# Patient Record
Sex: FEMALE | Race: WHITE | NOT HISPANIC OR LATINO | Employment: FULL TIME | URBAN - METROPOLITAN AREA
[De-identification: names, ages, dates, MRNs, and addresses within clinical notes are randomized per-mention and may not be internally consistent; named-entity substitution may affect disease eponyms.]

---

## 2017-04-06 ENCOUNTER — OFFICE VISIT - RIVER FALLS (OUTPATIENT)
Dept: FAMILY MEDICINE | Facility: CLINIC | Age: 27
End: 2017-04-06

## 2017-04-06 ASSESSMENT — MIFFLIN-ST. JEOR: SCORE: 1759.15

## 2021-05-27 ENCOUNTER — RECORDS - HEALTHEAST (OUTPATIENT)
Dept: ADMINISTRATIVE | Facility: CLINIC | Age: 31
End: 2021-05-27

## 2021-05-30 ENCOUNTER — RECORDS - HEALTHEAST (OUTPATIENT)
Dept: ADMINISTRATIVE | Facility: CLINIC | Age: 31
End: 2021-05-30

## 2021-10-30 LAB
ABO (EXTERNAL): NORMAL
HEMOGLOBIN (EXTERNAL): 14 G/DL (ref 12–16)
HEPATITIS B SURFACE ANTIGEN (EXTERNAL): NONREACTIVE
HIV1+2 AB SERPL QL IA: NONREACTIVE
PLATELET COUNT (EXTERNAL): 270 10E3/UL (ref 150–400)
RH (EXTERNAL): POSITIVE
RUBELLA ANTIBODY IGG (EXTERNAL): NORMAL
VDRL (SYPHILIS) (EXTERNAL): NONREACTIVE

## 2022-02-12 VITALS
DIASTOLIC BLOOD PRESSURE: 86 MMHG | TEMPERATURE: 98.6 F | BODY MASS INDEX: 38.32 KG/M2 | WEIGHT: 230 LBS | HEIGHT: 65 IN | SYSTOLIC BLOOD PRESSURE: 134 MMHG | HEART RATE: 84 BPM

## 2022-02-16 NOTE — PROGRESS NOTES
Patient:   NEGRO ROGERS            MRN: [id number]            FIN: [id number]               Age:   26 years     Sex:  Female     :  1990   Associated Diagnoses:   Acute sinusitis   Author:   Venecia Jacobs      Visit Information   Source of history:  Self.       Chief Complaint   2017 11:27 AM CDT    Patient is here with c/o sinus pressure, headache, congestion and drainage, couging, chills, sore throat, and some SOB with exertion for past 1 week.        History of Present Illness   Pt complains of sinus pain and pressure, green/yellow nasal discharge and fatigue x 7 days.  She has not checked her temperature and has not felt sweaty, like she normally does when she has a fever, but she did feel chills one night.  She has had a mild headache off/on, but not her typical migraines.  She has a productive cough.  Feels winded after climbing stairs which is not typical for her.  She has been sleeping more and increased her water intake.  Takes nighttime cold and flu combo med product with some relief, but feels overall that she is not getting better.  She gets 1-2 sinus infections per year.  Believes that these are related to seasonal allergies.      Review of Systems   ROS negative except as noted in HPI.      Health Status   Allergies:    Allergic Reactions (Selected)  Severity Not Documented  Ceclor (No reactions were documented)  Nonallergic Reactions (Selected)  Mild  Zithromax (No reactions were documented)  Severity Not Documented  Pertussis vaccines (No reactions were documented)   Problem list:    All Problems (Selected)  Chronic tension headache / SNOMED CT 976604370 / Confirmed  LSIL (Low Grade Squamous Intraepithelial Lesion) on PAP Smear / ICD-9-.03 / Confirmed  Migraine / SNOMED CT 36933539 / Confirmed  Obesity / ICD-9-.00 / Probable  Chronic insomnia / SNOMED CT 796105435 / Confirmed  Tobacco user / SNOMED CT 040741735 / Probable   Medications:  (Selected)    Prescriptions  Prescribed  Diflucan 150 mg oral tablet: 1 tab(s) ( 150 mg ), po, once, # 1 tab(s), 0 Refill(s), Type: Soft Stop, Pharmacy: [facility], 1 tab(s) po once  Flonase 50 mcg/inh nasal spray: 1 spray(s), Nasal, Daily, # 1 EA, 2 Refill(s), Type: Soft Stop, Pharmacy: [facility],  spray(s) nasal daily  Inderal 20 mg oral tablet: 1 tab(s) ( 20 mg ), PO, daily, # 90 tab(s), 3 Refill(s), Type: Maintenance, Pharmacy: [facility], 1 tab(s) po bid,x90 day(s)  Loestrin Fe 1.5/30 oral tablet: 1 tab(s), PO, Daily, Instructions: take 21 days of active tablets then skip placebos and start the next pack, # 84 tab(s), 4 Refill(s), Type: Maintenance, Pharmacy: [facility], 1 tab(s) po daily,Instr:take 21 days of active tablets...  Prilosec 20 mg oral delayed release capsule: 1 cap(s) ( 20 mg ), PO, daily, Instructions: PRN, # 90 cap(s), 3 Refill(s), Type: Maintenance, Pharmacy: [facility]  ProAir HFA 90 mcg/inh inhalation aerosol: 2 puff(s), inh, qid, PRN: as needed for wheezing, # 3 EA, 3 Refill(s), Type: Maintenance, Pharmacy: [facility], 2 puff(s) inh qid,PRN:as needed for wheezing  amoxicillin 875 mg oral tablet: 1 tab(s) ( 875 mg ), PO, BID, # 20 tab(s), 0 Refill(s), Type: Maintenance, Pharmacy: [facility], 1 tab(s) po bid,x10 day(s)  ketorolac 10 mg oral tablet: See Instructions, Instructions: 1 tab(s) PO if migraine not leaving.   No more than 2 a week  20 tabs, PRN: for pain, # 20 tab(s), 0 Refill(s), Type: Maintenance, Pharmacy: [facility], 1 tab(s) PO if migraine not leaving.   No more...  Documented Medications  Documented  Topamax 25 mg oral tablet: 1 tab(s) ( 25 mg ), po, tid, 0 Refill(s), Type: Maintenance  loratadine: ( 10 mg ), prn, 0 Refill(s), Type: Maintenance  naproxen 250 mg oral tablet: 1 tab(s) ( 250 mg ), PO, tid, # 60 tab(s), 0 Refill(s), Type:  Maintenance      Histories   Past Medical History:    Active  Migraine (11195977)   Family History:    MI  Mother (Ana Paula)  Appendicitis  Mother (Ana Paula)  Polyp of colon  Father (Ean)  Migraine  Mother (Ana Paula)  Sister (Mesha)  Hypercholesterolemia  Father (Ean)  Cholecystectomy  Grandmother (P): onset at 16 .  Grandmother (M)  Skin cancer  Father (Ean)     Social History:        Alcohol Assessment: Current            Current, less than 1 time/week, 1 drinks/episode average.  1 drinks/episode maximum.      Tobacco Assessment: Current            Current (some day smoker)                     Comments:                      08/24/2016 - Natalie Rosario                     trying to quit      Substance Abuse Assessment: Current            Current, Marijuana      Employment and Education Assessment            Employed, Work/School description: Care Coordinator.      Home and Environment Assessment            Marital status: .  Spouse/Partner name: Jacoby Garcia.      Exercise and Physical Activity Assessment: Regular exercise            Exercise frequency: 3-5 times/week.  Exercise type: Walking.      Sexual Assessment            Sexually active: Yes.  Sexual orientation: Homosexual.  Contraceptive Use Details: Birth control pill.      Other Assessment            Last eye exam 7/12; Last dental exam 12/12        Physical Examination   Vital Signs   4/6/2017 11:27 AM CDT Temperature Tympanic 98.6 DegF    Peripheral Pulse Rate 84 bpm    HR Method Electronic    Systolic Blood Pressure 134 mmHg    Diastolic Blood Pressure 86 mmHg    Mean Arterial Pressure 102 mmHg    BP Site Right arm    BP Method Electronic      Measurements from flowsheet : Measurements   4/6/2017 11:27 AM CDT Height Measured - Standard 65 in    Weight Measured - Standard 230 lb    BSA 2.19 m2    Body Mass Index 38.27 kg/m2    Ht/Wt Measurement Refused by Patient? No      General:  Alert and oriented, No acute distress.    Eye:  Normal conjunctiva.     HENT:  Normocephalic, Oral mucosa is moist, No pharyngeal erythema.         Sinus: Bilateral, Ethmoid sinus, Maxillary sinus, Tenderness.    Neck:  Supple, Non-tender, No lymphadenopathy.    Respiratory:  Breath sounds are equal, Symmetrical chest wall expansion.         Respirations: Are within normal limits.         Pattern: Regular.    Cardiovascular:  Normal rate, Regular rhythm, No edema.    Musculoskeletal:  Normal gait.    Integumentary:  Warm, Dry, No rash.    Neurologic:  Alert, Oriented.    Cognition and Speech:  Oriented, Speech clear and coherent.    Psychiatric:  Cooperative, Appropriate mood & affect, Normal judgment.       Impression and Plan   Diagnosis     Acute sinusitis ([id number] J01.90).     Patient Instructions:       Counseled: Patient, Regarding diagnosis, Regarding treatment, Regarding medications.    Pt offered conservative management as I can't be certain that this is bacterial in origin.  Pt wants to take abx.  Rx for amoxicillin, flonase and diflucan prn if she develops vaginal yeast after taking antibiotics.

## 2022-05-09 ENCOUNTER — EXTERNAL ORDER RESULTS (OUTPATIENT)
Dept: LAB | Facility: CLINIC | Age: 32
End: 2022-05-09
Payer: COMMERCIAL

## 2022-05-12 LAB — GROUP B STREPTOCOCCUS (EXTERNAL): NEGATIVE

## 2022-05-18 ENCOUNTER — HOSPITAL ENCOUNTER (OUTPATIENT)
Facility: HOSPITAL | Age: 32
Discharge: HOME | End: 2022-05-18
Attending: OBSTETRICS & GYNECOLOGY | Admitting: OBSTETRICS & GYNECOLOGY
Payer: COMMERCIAL

## 2022-05-18 ENCOUNTER — HOSPITAL ENCOUNTER (INPATIENT)
Facility: CLINIC | Age: 32
LOS: 4 days | Discharge: HOME HEALTH | End: 2022-05-22
Attending: OBSTETRICS & GYNECOLOGY | Admitting: OBSTETRICS & GYNECOLOGY
Payer: COMMERCIAL

## 2022-05-18 ENCOUNTER — HOSPITAL ENCOUNTER (OUTPATIENT)
Facility: HOSPITAL | Age: 32
End: 2022-05-18
Admitting: OBSTETRICS & GYNECOLOGY
Payer: COMMERCIAL

## 2022-05-18 VITALS
HEART RATE: 101 BPM | RESPIRATION RATE: 18 BRPM | TEMPERATURE: 98.6 F | SYSTOLIC BLOOD PRESSURE: 136 MMHG | HEIGHT: 64 IN | WEIGHT: 237.44 LBS | BODY MASS INDEX: 40.54 KG/M2 | DIASTOLIC BLOOD PRESSURE: 82 MMHG | OXYGEN SATURATION: 97 %

## 2022-05-18 DIAGNOSIS — O13.3 PREGNANCY-INDUCED HYPERTENSION IN THIRD TRIMESTER: Primary | ICD-10-CM

## 2022-05-18 PROBLEM — O13.9 PIH (PREGNANCY INDUCED HYPERTENSION): Status: ACTIVE | Noted: 2022-05-18

## 2022-05-18 PROBLEM — G43.909 MIGRAINE: Status: ACTIVE | Noted: 2022-05-18

## 2022-05-18 LAB
ABO/RH(D): NORMAL
ABO/RH(D): NORMAL
ALBUMIN MFR UR ELPH: 11.3 MG/DL
ALT SERPL W P-5'-P-CCNC: 17 U/L (ref 0–45)
ANION GAP SERPL CALCULATED.3IONS-SCNC: 9 MMOL/L (ref 5–18)
ANTIBODY SCREEN: NEGATIVE
ANTIBODY SCREEN: NEGATIVE
AST SERPL W P-5'-P-CCNC: 17 U/L (ref 0–40)
BUN SERPL-MCNC: 6 MG/DL (ref 8–22)
CALCIUM SERPL-MCNC: 9.3 MG/DL (ref 8.5–10.5)
CHLORIDE BLD-SCNC: 107 MMOL/L (ref 98–107)
CO2 SERPL-SCNC: 20 MMOL/L (ref 22–31)
CREAT SERPL-MCNC: 0.6 MG/DL (ref 0.6–1.1)
CREAT UR-MCNC: 77 MG/DL
ERYTHROCYTE [DISTWIDTH] IN BLOOD BY AUTOMATED COUNT: 13.8 % (ref 10–15)
GFR SERPL CREATININE-BSD FRML MDRD: >90 ML/MIN/1.73M2
GLUCOSE BLD-MCNC: 105 MG/DL (ref 70–125)
HCT VFR BLD AUTO: 37.2 % (ref 35–47)
HGB BLD-MCNC: 12.6 G/DL (ref 11.7–15.7)
HOLD SPECIMEN: NORMAL
MCH RBC QN AUTO: 30.9 PG (ref 26.5–33)
MCHC RBC AUTO-ENTMCNC: 33.9 G/DL (ref 31.5–36.5)
MCV RBC AUTO: 91 FL (ref 78–100)
PLATELET # BLD AUTO: 184 10E3/UL (ref 150–450)
POTASSIUM BLD-SCNC: 3.8 MMOL/L (ref 3.5–5)
PROT/CREAT 24H UR: 0.15 MG/MG CR
RBC # BLD AUTO: 4.08 10E6/UL (ref 3.8–5.2)
SARS-COV-2 RNA RESP QL NAA+PROBE: NEGATIVE
SODIUM SERPL-SCNC: 136 MMOL/L (ref 136–145)
SPECIMEN EXPIRATION DATE: NORMAL
SPECIMEN EXPIRATION DATE: NORMAL
WBC # BLD AUTO: 11.6 10E3/UL (ref 4–11)

## 2022-05-18 PROCEDURE — 84156 ASSAY OF PROTEIN URINE: CPT | Performed by: OBSTETRICS & GYNECOLOGY

## 2022-05-18 PROCEDURE — 80048 BASIC METABOLIC PNL TOTAL CA: CPT | Performed by: OBSTETRICS & GYNECOLOGY

## 2022-05-18 PROCEDURE — 250N000013 HC RX MED GY IP 250 OP 250 PS 637: Performed by: OBSTETRICS & GYNECOLOGY

## 2022-05-18 PROCEDURE — C9803 HOPD COVID-19 SPEC COLLECT: HCPCS

## 2022-05-18 PROCEDURE — 120N000001 HC R&B MED SURG/OB

## 2022-05-18 PROCEDURE — 86901 BLOOD TYPING SEROLOGIC RH(D): CPT | Performed by: OBSTETRICS & GYNECOLOGY

## 2022-05-18 PROCEDURE — 250N000011 HC RX IP 250 OP 636: Performed by: OBSTETRICS & GYNECOLOGY

## 2022-05-18 PROCEDURE — 36415 COLL VENOUS BLD VENIPUNCTURE: CPT | Performed by: OBSTETRICS & GYNECOLOGY

## 2022-05-18 PROCEDURE — 84460 ALANINE AMINO (ALT) (SGPT): CPT | Performed by: OBSTETRICS & GYNECOLOGY

## 2022-05-18 PROCEDURE — 86850 RBC ANTIBODY SCREEN: CPT | Performed by: OBSTETRICS & GYNECOLOGY

## 2022-05-18 PROCEDURE — G0463 HOSPITAL OUTPT CLINIC VISIT: HCPCS

## 2022-05-18 PROCEDURE — 85027 COMPLETE CBC AUTOMATED: CPT | Performed by: OBSTETRICS & GYNECOLOGY

## 2022-05-18 PROCEDURE — U0005 INFEC AGEN DETEC AMPLI PROBE: HCPCS | Performed by: OBSTETRICS & GYNECOLOGY

## 2022-05-18 PROCEDURE — 84450 TRANSFERASE (AST) (SGOT): CPT | Performed by: OBSTETRICS & GYNECOLOGY

## 2022-05-18 RX ORDER — MAGNESIUM SULFATE HEPTAHYDRATE 40 MG/ML
2 INJECTION, SOLUTION INTRAVENOUS
Status: DISCONTINUED | OUTPATIENT
Start: 2022-05-18 | End: 2022-05-22 | Stop reason: HOSPADM

## 2022-05-18 RX ORDER — OXYTOCIN/0.9 % SODIUM CHLORIDE 30/500 ML
100-340 PLASTIC BAG, INJECTION (ML) INTRAVENOUS CONTINUOUS PRN
Status: DISCONTINUED | OUTPATIENT
Start: 2022-05-18 | End: 2022-05-22 | Stop reason: HOSPADM

## 2022-05-18 RX ORDER — KETOROLAC TROMETHAMINE 30 MG/ML
30 INJECTION, SOLUTION INTRAMUSCULAR; INTRAVENOUS
Status: DISCONTINUED | OUTPATIENT
Start: 2022-05-18 | End: 2022-05-22 | Stop reason: HOSPADM

## 2022-05-18 RX ORDER — ACETAMINOPHEN 325 MG/1
650 TABLET ORAL EVERY 4 HOURS PRN
Status: DISCONTINUED | OUTPATIENT
Start: 2022-05-18 | End: 2022-05-19

## 2022-05-18 RX ORDER — IBUPROFEN 800 MG/1
800 TABLET, FILM COATED ORAL
Status: DISCONTINUED | OUTPATIENT
Start: 2022-05-18 | End: 2022-05-22 | Stop reason: HOSPADM

## 2022-05-18 RX ORDER — MULTIVIT-MIN/IRON/FOLIC ACID/K 18-600-40
2 CAPSULE ORAL DAILY
COMMUNITY

## 2022-05-18 RX ORDER — OXYTOCIN/0.9 % SODIUM CHLORIDE 30/500 ML
340 PLASTIC BAG, INJECTION (ML) INTRAVENOUS CONTINUOUS PRN
Status: DISCONTINUED | OUTPATIENT
Start: 2022-05-18 | End: 2022-05-21 | Stop reason: HOSPADM

## 2022-05-18 RX ORDER — ACETAMINOPHEN 325 MG/1
650 TABLET ORAL EVERY 4 HOURS PRN
Status: DISCONTINUED | OUTPATIENT
Start: 2022-05-18 | End: 2022-05-18 | Stop reason: HOSPADM

## 2022-05-18 RX ORDER — METOCLOPRAMIDE HYDROCHLORIDE 5 MG/ML
10 INJECTION INTRAMUSCULAR; INTRAVENOUS EVERY 6 HOURS PRN
Status: DISCONTINUED | OUTPATIENT
Start: 2022-05-18 | End: 2022-05-21 | Stop reason: HOSPADM

## 2022-05-18 RX ORDER — DOCUSATE SODIUM 100 MG/1
100 CAPSULE, LIQUID FILLED ORAL 2 TIMES DAILY
Status: DISCONTINUED | OUTPATIENT
Start: 2022-05-18 | End: 2022-05-21 | Stop reason: HOSPADM

## 2022-05-18 RX ORDER — OXYCODONE HYDROCHLORIDE 5 MG/1
5 TABLET ORAL
Status: DISCONTINUED | OUTPATIENT
Start: 2022-05-18 | End: 2022-05-22 | Stop reason: HOSPADM

## 2022-05-18 RX ORDER — HYDROXYZINE HYDROCHLORIDE 25 MG/1
50 TABLET, FILM COATED ORAL
Status: DISCONTINUED | OUTPATIENT
Start: 2022-05-18 | End: 2022-05-21 | Stop reason: HOSPADM

## 2022-05-18 RX ORDER — ONDANSETRON 2 MG/ML
4 INJECTION INTRAMUSCULAR; INTRAVENOUS EVERY 6 HOURS PRN
Status: DISCONTINUED | OUTPATIENT
Start: 2022-05-18 | End: 2022-05-21 | Stop reason: HOSPADM

## 2022-05-18 RX ORDER — MISOPROSTOL 100 UG/1
25 TABLET ORAL
Status: DISCONTINUED | OUTPATIENT
Start: 2022-05-18 | End: 2022-05-21 | Stop reason: HOSPADM

## 2022-05-18 RX ORDER — MAGNESIUM SULFATE HEPTAHYDRATE 500 MG/ML
10 INJECTION, SOLUTION INTRAMUSCULAR; INTRAVENOUS
Status: DISCONTINUED | OUTPATIENT
Start: 2022-05-18 | End: 2022-05-22 | Stop reason: HOSPADM

## 2022-05-18 RX ORDER — METHYLERGONOVINE MALEATE 0.2 MG/ML
200 INJECTION INTRAVENOUS
Status: DISCONTINUED | OUTPATIENT
Start: 2022-05-18 | End: 2022-05-21 | Stop reason: HOSPADM

## 2022-05-18 RX ORDER — CITRIC ACID/SODIUM CITRATE 334-500MG
30 SOLUTION, ORAL ORAL
Status: DISCONTINUED | OUTPATIENT
Start: 2022-05-18 | End: 2022-05-21 | Stop reason: HOSPADM

## 2022-05-18 RX ORDER — NIFEDIPINE 10 MG/1
10-20 CAPSULE ORAL
Status: DISCONTINUED | OUTPATIENT
Start: 2022-05-18 | End: 2022-05-22 | Stop reason: HOSPADM

## 2022-05-18 RX ORDER — LORAZEPAM 2 MG/ML
2 INJECTION INTRAMUSCULAR
Status: DISCONTINUED | OUTPATIENT
Start: 2022-05-18 | End: 2022-05-22 | Stop reason: HOSPADM

## 2022-05-18 RX ORDER — FAMOTIDINE 10 MG
10 TABLET ORAL DAILY
COMMUNITY

## 2022-05-18 RX ORDER — NALOXONE HYDROCHLORIDE 0.4 MG/ML
0.4 INJECTION, SOLUTION INTRAMUSCULAR; INTRAVENOUS; SUBCUTANEOUS
Status: DISCONTINUED | OUTPATIENT
Start: 2022-05-18 | End: 2022-05-21 | Stop reason: HOSPADM

## 2022-05-18 RX ORDER — MULTIVITAMIN WITH IRON
1 TABLET ORAL DAILY
COMMUNITY

## 2022-05-18 RX ORDER — MAGNESIUM SULFATE 4 G/50ML
4 INJECTION INTRAVENOUS
Status: DISCONTINUED | OUTPATIENT
Start: 2022-05-18 | End: 2022-05-22 | Stop reason: HOSPADM

## 2022-05-18 RX ORDER — PROCHLORPERAZINE MALEATE 10 MG
10 TABLET ORAL EVERY 6 HOURS PRN
Status: DISCONTINUED | OUTPATIENT
Start: 2022-05-18 | End: 2022-05-21 | Stop reason: HOSPADM

## 2022-05-18 RX ORDER — NALOXONE HYDROCHLORIDE 0.4 MG/ML
0.2 INJECTION, SOLUTION INTRAMUSCULAR; INTRAVENOUS; SUBCUTANEOUS
Status: DISCONTINUED | OUTPATIENT
Start: 2022-05-18 | End: 2022-05-21 | Stop reason: HOSPADM

## 2022-05-18 RX ORDER — MISOPROSTOL 200 UG/1
400 TABLET ORAL
Status: DISCONTINUED | OUTPATIENT
Start: 2022-05-18 | End: 2022-05-21 | Stop reason: HOSPADM

## 2022-05-18 RX ORDER — ACETAMINOPHEN 325 MG/1
325-650 TABLET ORAL EVERY 6 HOURS PRN
COMMUNITY

## 2022-05-18 RX ORDER — CARBOPROST TROMETHAMINE 250 UG/ML
250 INJECTION, SOLUTION INTRAMUSCULAR
Status: DISCONTINUED | OUTPATIENT
Start: 2022-05-18 | End: 2022-05-21 | Stop reason: HOSPADM

## 2022-05-18 RX ORDER — LABETALOL HYDROCHLORIDE 5 MG/ML
20 INJECTION, SOLUTION INTRAVENOUS
Status: DISCONTINUED | OUTPATIENT
Start: 2022-05-18 | End: 2022-05-22 | Stop reason: HOSPADM

## 2022-05-18 RX ORDER — LIDOCAINE 40 MG/G
CREAM TOPICAL
Status: DISCONTINUED | OUTPATIENT
Start: 2022-05-18 | End: 2022-05-18 | Stop reason: HOSPADM

## 2022-05-18 RX ORDER — ONDANSETRON 4 MG/1
4 TABLET, ORALLY DISINTEGRATING ORAL EVERY 6 HOURS PRN
Status: DISCONTINUED | OUTPATIENT
Start: 2022-05-18 | End: 2022-05-21 | Stop reason: HOSPADM

## 2022-05-18 RX ORDER — METOCLOPRAMIDE 10 MG/1
10 TABLET ORAL EVERY 6 HOURS PRN
Status: DISCONTINUED | OUTPATIENT
Start: 2022-05-18 | End: 2022-05-21 | Stop reason: HOSPADM

## 2022-05-18 RX ORDER — PROCHLORPERAZINE 25 MG
25 SUPPOSITORY, RECTAL RECTAL EVERY 12 HOURS PRN
Status: DISCONTINUED | OUTPATIENT
Start: 2022-05-18 | End: 2022-05-21 | Stop reason: HOSPADM

## 2022-05-18 RX ORDER — DOCUSATE SODIUM 100 MG/1
100 CAPSULE, LIQUID FILLED ORAL 2 TIMES DAILY
COMMUNITY

## 2022-05-18 RX ORDER — OXYTOCIN 10 [USP'U]/ML
10 INJECTION, SOLUTION INTRAMUSCULAR; INTRAVENOUS
Status: DISCONTINUED | OUTPATIENT
Start: 2022-05-18 | End: 2022-05-22 | Stop reason: HOSPADM

## 2022-05-18 RX ORDER — PRENATAL VIT/IRON FUM/FOLIC AC 27MG-0.8MG
1 TABLET ORAL DAILY
COMMUNITY

## 2022-05-18 RX ORDER — MORPHINE SULFATE 10 MG/ML
15 INJECTION, SOLUTION INTRAMUSCULAR; INTRAVENOUS
Status: COMPLETED | OUTPATIENT
Start: 2022-05-18 | End: 2022-05-18

## 2022-05-18 RX ORDER — OXYTOCIN 10 [USP'U]/ML
10 INJECTION, SOLUTION INTRAMUSCULAR; INTRAVENOUS
Status: DISCONTINUED | OUTPATIENT
Start: 2022-05-18 | End: 2022-05-21 | Stop reason: HOSPADM

## 2022-05-18 RX ORDER — MISOPROSTOL 200 UG/1
800 TABLET ORAL
Status: DISCONTINUED | OUTPATIENT
Start: 2022-05-18 | End: 2022-05-21 | Stop reason: HOSPADM

## 2022-05-18 RX ADMIN — DOCUSATE SODIUM 100 MG: 100 CAPSULE, LIQUID FILLED ORAL at 22:42

## 2022-05-18 RX ADMIN — MISOPROSTOL 25 MCG: 100 TABLET ORAL at 22:42

## 2022-05-18 RX ADMIN — ACETAMINOPHEN 650 MG: 325 TABLET ORAL at 16:01

## 2022-05-18 RX ADMIN — HYDROXYZINE HYDROCHLORIDE 50 MG: 25 TABLET, FILM COATED ORAL at 22:41

## 2022-05-18 RX ADMIN — MORPHINE SULFATE 15 MG: 10 INJECTION INTRAVENOUS at 22:45

## 2022-05-18 RX ADMIN — ACETAMINOPHEN 650 MG: 325 TABLET ORAL at 22:40

## 2022-05-18 ASSESSMENT — ACTIVITIES OF DAILY LIVING (ADL)
CHANGE_IN_FUNCTIONAL_STATUS_SINCE_ONSET_OF_CURRENT_ILLNESS/INJURY: NO
DIFFICULTY_COMMUNICATING: NO
DOING_ERRANDS_INDEPENDENTLY_DIFFICULTY: NO
WALKING_OR_CLIMBING_STAIRS_DIFFICULTY: NO
HEARING_DIFFICULTY_OR_DEAF: NO
FALL_HISTORY_WITHIN_LAST_SIX_MONTHS: NO
TOILETING_ISSUES: NO
DRESSING/BATHING_DIFFICULTY: NO
WEAR_GLASSES_OR_BLIND: NO
DIFFICULTY_EATING/SWALLOWING: NO
CONCENTRATING,_REMEMBERING_OR_MAKING_DECISIONS_DIFFICULTY: NO
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
## 2022-05-18 NOTE — DISCHARGE INSTRUCTIONS
Please go to [facility] this evening for induction of labor per patient care plan as discussed with Dr Huber

## 2022-05-19 LAB
HOLD SPECIMEN: NORMAL
HOLD SPECIMEN: NORMAL
PLATELET # BLD AUTO: 153 10E3/UL (ref 150–450)

## 2022-05-19 PROCEDURE — 250N000013 HC RX MED GY IP 250 OP 250 PS 637: Performed by: OBSTETRICS & GYNECOLOGY

## 2022-05-19 PROCEDURE — 36415 COLL VENOUS BLD VENIPUNCTURE: CPT

## 2022-05-19 PROCEDURE — 258N000003 HC RX IP 258 OP 636: Performed by: OBSTETRICS & GYNECOLOGY

## 2022-05-19 PROCEDURE — 120N000001 HC R&B MED SURG/OB

## 2022-05-19 PROCEDURE — 85049 AUTOMATED PLATELET COUNT: CPT

## 2022-05-19 PROCEDURE — 250N000009 HC RX 250: Performed by: OBSTETRICS & GYNECOLOGY

## 2022-05-19 RX ORDER — FENTANYL CITRATE 50 UG/ML
100 INJECTION, SOLUTION INTRAMUSCULAR; INTRAVENOUS
Status: DISCONTINUED | OUTPATIENT
Start: 2022-05-19 | End: 2022-05-21 | Stop reason: HOSPADM

## 2022-05-19 RX ORDER — ACETAMINOPHEN 325 MG/1
650 TABLET ORAL EVERY 4 HOURS PRN
Status: DISCONTINUED | OUTPATIENT
Start: 2022-05-19 | End: 2022-05-21 | Stop reason: HOSPADM

## 2022-05-19 RX ORDER — LIDOCAINE 40 MG/G
CREAM TOPICAL
Status: DISCONTINUED | OUTPATIENT
Start: 2022-05-19 | End: 2022-05-21 | Stop reason: HOSPADM

## 2022-05-19 RX ORDER — OXYTOCIN/0.9 % SODIUM CHLORIDE 30/500 ML
1-24 PLASTIC BAG, INJECTION (ML) INTRAVENOUS CONTINUOUS
Status: DISCONTINUED | OUTPATIENT
Start: 2022-05-19 | End: 2022-05-21 | Stop reason: HOSPADM

## 2022-05-19 RX ORDER — SODIUM CHLORIDE, SODIUM LACTATE, POTASSIUM CHLORIDE, CALCIUM CHLORIDE 600; 310; 30; 20 MG/100ML; MG/100ML; MG/100ML; MG/100ML
INJECTION, SOLUTION INTRAVENOUS CONTINUOUS PRN
Status: DISCONTINUED | OUTPATIENT
Start: 2022-05-19 | End: 2022-05-21 | Stop reason: HOSPADM

## 2022-05-19 RX ADMIN — MISOPROSTOL 25 MCG: 100 TABLET ORAL at 07:22

## 2022-05-19 RX ADMIN — MISOPROSTOL 25 MCG: 100 TABLET ORAL at 00:51

## 2022-05-19 RX ADMIN — DOCUSATE SODIUM 100 MG: 100 CAPSULE, LIQUID FILLED ORAL at 09:22

## 2022-05-19 RX ADMIN — Medication 2 MILLI-UNITS/MIN: at 22:20

## 2022-05-19 RX ADMIN — MISOPROSTOL 25 MCG: 100 TABLET ORAL at 03:07

## 2022-05-19 RX ADMIN — ACETAMINOPHEN 650 MG: 325 TABLET ORAL at 11:06

## 2022-05-19 RX ADMIN — MISOPROSTOL 25 MCG: 100 TABLET ORAL at 09:22

## 2022-05-19 RX ADMIN — MISOPROSTOL 25 MCG: 100 TABLET ORAL at 05:19

## 2022-05-19 RX ADMIN — SODIUM CHLORIDE, POTASSIUM CHLORIDE, SODIUM LACTATE AND CALCIUM CHLORIDE: 600; 310; 30; 20 INJECTION, SOLUTION INTRAVENOUS at 22:19

## 2022-05-19 NOTE — PROGRESS NOTES
"Pt presents to unit for a scheduled induction of labor for hypertension. Upon arrival to unit, pt reports that she has had a headache, but she also has chronic migraines and \"it's hard to tell if it's my regular migraine or something else.\" She rates her headache pain 3/10 on pain scale. She denies presence of visual changes, epigastric/RUQ pain, nausea. She does have +1 pitting edema to her BLE. DTR are WNL. No clonus. FM+ per pt and with leopolds. Pt has been having cramping \"all day\". Denies leaking any fluid or bleeding vaginally.   Dr Huber called and updated on patient's arrival. Plan of care cytotec for ripening, sleep meds. Pt verbalized agreement with plan. Dr Cedillo will follow patient through the night.     SVE perfomed due to presence of contractions - reveals 1/60/-3, sutures felt with exam. Faye score 7. Pt given 1st dose of cytotec and sleep meds. She is aware of s/sx to report to RN.  and call light at bedside. Pt denies any other questions or concerns at this time.   "

## 2022-05-19 NOTE — PROGRESS NOTES
Pt reports that her headache has been improved since receiving the medication to help her sleep. She denies any visual changes, epigastric/RUQ pain, nausea. Pitting edema remains unchanged to her feet, general edema to her legs.   FHR has been difficult to keep on EFM if pt is not on her right side or semi-fowlers. FHR found to be in a slightly higher position as compared to admission - resident MD, Dr Serna, called and updated and performed a bedside US to confirm vertex.     Pt denies feeling any contractions or cramping when she is lying down and has been able to sleep well tonight.

## 2022-05-19 NOTE — PROGRESS NOTES
Pt reports that she has been able to sleep after receiving meds. After patient took her 2nd dose of cytotec, she ambulated to the BR and then changed positions in bed. This RN had a difficult time keeping FHR on EFM while she is lying on her left side. RN remained at bedside and could auscultate the FHR, but it would not record on EFM. This RN attempted to apply the Loyda monitor x2 and was unsuccessful with the signal in the room despite much troubleshooting. Pt repositioned back on her right side and EFM applied. FM+ per pt and by leopolds for the duration of the difficulty monitoring the baby.   Pt is back resting. Will delay pt's 3rd dose of cytotec until longer duration of EFM obtained.

## 2022-05-19 NOTE — H&P
[facility] Labor and Delivery History and Physical    Renetta Mccauley MRN# [id number]   Age: 32 year old YOB: 1990     Date of Admission:  2022    Primary care provider: Leonela Sanches           Chief Complaint:   Renetta Mccauley is a 32 year old female who is 38w0d pregnant and being admitted for Gestational Hypertension. Has hisotry of migraines and the current HA is mild. No visual changes, abd pain.BPs with diastolics in 90s at [facility] today. No contracitons, vag bleeding or ROM. Good FM.          Pregnancy history:     OBSTETRIC HISTORY:    OB History    Para Term  AB Living   1 0 0 0 0 0   SAB IAB Ectopic Multiple Live Births   0 0 0 0 0      # Outcome Date GA Lbr Samson/2nd Weight Sex Delivery Anes PTL Lv   1 Current                EDC: Estimated Date of Delivery: 22    Prenatal Labs:   Lab Results   Component Value Date    AS Negative 2022    HGB 12.6 2022       GBS Status:   No results found for: GBS    Active Problem List  Patient Active Problem List   Diagnosis     Migraine       Medication Prior to Admission  Medications Prior to Admission   Medication Sig Dispense Refill Last Dose     acetaminophen (TYLENOL) 325 MG tablet Take 325-650 mg by mouth every 6 hours as needed for mild pain   2022 at 1600     docusate sodium (COLACE) 100 MG capsule Take 100 mg by mouth 2 times daily   2022 at Unknown time     doxylamine (UNISOM) 25 MG TABS tablet Take 25 mg by mouth At Bedtime   2022 at Unknown time     famotidine (PEPCID) 10 MG tablet Take 10 mg by mouth daily   2022 at Unknown time     magnesium 250 MG tablet Take 1 tablet by mouth daily   2022 at Unknown time     Prenatal Vit-Fe Fumarate-FA (PRENATAL MULTIVITAMIN W/IRON) 27-0.8 MG tablet Take 1 tablet by mouth daily   2022 at Unknown time     Vitamin D (Cholecalciferol) 25 MCG (1000 UT) TABS Take 2 tablets by mouth daily   2022 at Unknown time   .         Maternal Past Medical History:     Past Medical History:   Diagnosis Date     Hypertension      Migraine                        Family History:   This patient has no significant family history            Social History:   This patient has no significant social history         Review of Systems:   CONSTITUTIONAL: NEGATIVE for fever, chills, change in weight  ENT/MOUTH: NEGATIVE for ear, mouth and throat problems  RESP: NEGATIVE for significant cough or SOB  CV: NEGATIVE for chest pain, palpitations or peripheral edema          Physical Exam:   Vitals were reviewed  Temp: 97.8  F (36.6  C) Temp src: Oral BP: 133/74     Resp: 18        Lungs:   No increased work of breathing, good air exchange, clear to auscultation bilaterally, no crackles or wheezing     Cardiovascular:   regular rate and rhythm     Abdomen:   No scars, normal bowel sounds, soft, non-distended, non-tender, no masses palpated, no hepatosplenomegally      Cervix:   Membranes: intact   Cervix closed per Dr. porras today  Presentation:Vertex  Fetal Heart Rate Tracing: Cat 1  Tocometer: no contractions  HELLP labs normal and PCR 0.15                       Assessment:   Renetta Mccauley is a 38w0d pregnant female admitted with Gestations HTN at 38 weeks.          Plan:   cervical ripening with misoprostol p[er plan with Dr. Porras.   Will treat for Bps over 160/110      Ruddy Cedillo MD

## 2022-05-19 NOTE — PROGRESS NOTES
OB NOTE    Latent phase induction  BP good  Slept well  Plan Pitocin later today as needed    Rasta Huber MD

## 2022-05-19 NOTE — PROGRESS NOTES
OB NOTE    Ctx q 3-4 min  FHT:CAT1  Did not start Pit  BP normal range  Recheck @ 2 hours    Rasta Huber MD

## 2022-05-20 ENCOUNTER — ANESTHESIA (OUTPATIENT)
Dept: OBGYN | Facility: CLINIC | Age: 32
End: 2022-05-20
Payer: COMMERCIAL

## 2022-05-20 ENCOUNTER — ANESTHESIA EVENT (OUTPATIENT)
Dept: OBGYN | Facility: CLINIC | Age: 32
End: 2022-05-20
Payer: COMMERCIAL

## 2022-05-20 PROCEDURE — 250N000011 HC RX IP 250 OP 636: Performed by: ANESTHESIOLOGY

## 2022-05-20 PROCEDURE — 722N000001 HC LABOR CARE VAGINAL DELIVERY SINGLE

## 2022-05-20 PROCEDURE — 3E0R3BZ INTRODUCTION OF ANESTHETIC AGENT INTO SPINAL CANAL, PERCUTANEOUS APPROACH: ICD-10-PCS | Performed by: ANESTHESIOLOGY

## 2022-05-20 PROCEDURE — 00HU33Z INSERTION OF INFUSION DEVICE INTO SPINAL CANAL, PERCUTANEOUS APPROACH: ICD-10-PCS | Performed by: ANESTHESIOLOGY

## 2022-05-20 PROCEDURE — 250N000013 HC RX MED GY IP 250 OP 250 PS 637: Performed by: OBSTETRICS & GYNECOLOGY

## 2022-05-20 PROCEDURE — 3E033VJ INTRODUCTION OF OTHER HORMONE INTO PERIPHERAL VEIN, PERCUTANEOUS APPROACH: ICD-10-PCS | Performed by: OBSTETRICS & GYNECOLOGY

## 2022-05-20 PROCEDURE — 258N000003 HC RX IP 258 OP 636: Performed by: ANESTHESIOLOGY

## 2022-05-20 PROCEDURE — 120N000001 HC R&B MED SURG/OB

## 2022-05-20 PROCEDURE — 370N000003 HC ANESTHESIA WARD SERVICE

## 2022-05-20 PROCEDURE — 250N000009 HC RX 250: Performed by: OBSTETRICS & GYNECOLOGY

## 2022-05-20 PROCEDURE — 250N000013 HC RX MED GY IP 250 OP 250 PS 637

## 2022-05-20 PROCEDURE — 10907ZC DRAINAGE OF AMNIOTIC FLUID, THERAPEUTIC FROM PRODUCTS OF CONCEPTION, VIA NATURAL OR ARTIFICIAL OPENING: ICD-10-PCS | Performed by: OBSTETRICS & GYNECOLOGY

## 2022-05-20 PROCEDURE — 250N000011 HC RX IP 250 OP 636: Performed by: OBSTETRICS & GYNECOLOGY

## 2022-05-20 PROCEDURE — 250N000009 HC RX 250: Performed by: ANESTHESIOLOGY

## 2022-05-20 PROCEDURE — 258N000003 HC RX IP 258 OP 636: Performed by: OBSTETRICS & GYNECOLOGY

## 2022-05-20 PROCEDURE — 0KQM0ZZ REPAIR PERINEUM MUSCLE, OPEN APPROACH: ICD-10-PCS | Performed by: OBSTETRICS & GYNECOLOGY

## 2022-05-20 RX ORDER — BUPIVACAINE HYDROCHLORIDE 2.5 MG/ML
INJECTION, SOLUTION EPIDURAL; INFILTRATION; INTRACAUDAL
Status: COMPLETED | OUTPATIENT
Start: 2022-05-20 | End: 2022-05-20

## 2022-05-20 RX ORDER — NALBUPHINE HYDROCHLORIDE 10 MG/ML
2.5-5 INJECTION, SOLUTION INTRAMUSCULAR; INTRAVENOUS; SUBCUTANEOUS EVERY 6 HOURS PRN
Status: DISCONTINUED | OUTPATIENT
Start: 2022-05-20 | End: 2022-05-22 | Stop reason: HOSPADM

## 2022-05-20 RX ORDER — EPHEDRINE SULFATE 50 MG/ML
5 INJECTION, SOLUTION INTRAMUSCULAR; INTRAVENOUS; SUBCUTANEOUS
Status: DISCONTINUED | OUTPATIENT
Start: 2022-05-20 | End: 2022-05-21 | Stop reason: HOSPADM

## 2022-05-20 RX ADMIN — Medication 5 MG: at 01:33

## 2022-05-20 RX ADMIN — ACETAMINOPHEN 650 MG: 325 TABLET, COATED ORAL at 09:56

## 2022-05-20 RX ADMIN — KETOROLAC TROMETHAMINE 30 MG: 30 INJECTION, SOLUTION INTRAMUSCULAR at 15:55

## 2022-05-20 RX ADMIN — BUPIVACAINE HYDROCHLORIDE 8 ML: 2.5 INJECTION, SOLUTION EPIDURAL; INFILTRATION; INTRACAUDAL at 01:05

## 2022-05-20 RX ADMIN — Medication 5 MG: at 01:16

## 2022-05-20 RX ADMIN — Medication: at 01:04

## 2022-05-20 RX ADMIN — LIDOCAINE HYDROCHLORIDE 6 ML: 10 INJECTION, SOLUTION EPIDURAL; INFILTRATION; INTRACAUDAL; PERINEURAL at 15:01

## 2022-05-20 RX ADMIN — Medication: at 09:08

## 2022-05-20 RX ADMIN — Medication 340 ML/HR: at 14:53

## 2022-05-20 RX ADMIN — SODIUM CHLORIDE, POTASSIUM CHLORIDE, SODIUM LACTATE AND CALCIUM CHLORIDE 1000 ML: 600; 310; 30; 20 INJECTION, SOLUTION INTRAVENOUS at 05:30

## 2022-05-20 RX ADMIN — ACETAMINOPHEN 650 MG: 325 TABLET, COATED ORAL at 20:49

## 2022-05-20 RX ADMIN — SODIUM CHLORIDE, POTASSIUM CHLORIDE, SODIUM LACTATE AND CALCIUM CHLORIDE: 600; 310; 30; 20 INJECTION, SOLUTION INTRAVENOUS at 00:25

## 2022-05-20 RX ADMIN — DOCUSATE SODIUM 100 MG: 100 CAPSULE, LIQUID FILLED ORAL at 17:00

## 2022-05-20 NOTE — L&D DELIVERY NOTE
Delivery Summary    Renetta Mccauley MRN# [id number]   Age: 32 year old YOB: 1990     ASSESSMENT & PLAN: WOOD Mccauley Female-Renetta [[id number]]    Labor Event Times    Labor onset date: 22 Onset time:  5:00 PM   Dilation complete date: 22 Complete time: 10:50 AM   Start pushing date/time: 2022 1050      Labor Length    1st Stage (hrs): 17 (min): 50   2nd Stage (hrs): 3 (min): 56   3rd Stage (hrs): 0 (min): 7      Labor Events     labor?: No   steroids: None     Antibiotics received during labor?: No     Rupture date/time: 22 1704   Rupture type: Spontaneous rupture of membranes occuring during spontaneous labor or augmentation  Fluid color: Clear  Fluid odor: Normal     Induction: Oxytocin  Induction date/time:     Cervical ripening date/time:     Indications for induction: Mild Preeclampsia     Augmentation: AROM  Indications for augmentation: Hypertension     Delivery/Placenta Date and Time    Delivery Date: 22 Delivery Time:  2:46 PM   Placenta Date/Time: 2022  2:53 PM  Oxytocin given at the time of delivery: after delivery of placenta  Delivering clinician: Rasta Huber MD   Other personnel present at delivery:  Provider Role   Janiya Abdalla RN Delivery Nurse   Janiya Atkins RN Delivery Assist   Rasta Huber MD          Vaginal Counts     Initial count performed by 2 team members:  Two Team Members   MD Rm Huber RN       Needles Suture Needles Sponges (RETIRED) Instruments   Initial counts       Added to count 1 1     Relief counts       Final counts 1 1           Placed during labor Accounted for at the end of labor   FSE No NA   IUPC No NA   Cervidil No NA              Final count performed by 2 team members:  Two Team Members   MD Rm Huber RN      Final count correct?: Yes     Apgars    Living status: Living   1 Minute 5 Minute 10 Minute 15 Minute 20 Minute   Skin color: 0  1        Heart rate: 2  2       Reflex irritability: 2  2       Muscle tone: 2  2       Respiratory effort: 2  2       Total: 8  9       Apgars assigned by: ARELY ATKINS     Cord    Vessels: 3 Vessels    Cord Complications: Nuchal   Nuchal Intervention: clamped and cut         Nuchal cord description: loose nuchal cord         Cord Blood Disposition: Lab    Gases Sent?: No    Delayed cord clamping?: Yes    Cord Clamping Delay (seconds):  seconds    Stem cell collection?: No        Resuscitation    Methods: None     Skin to Skin and Feeding Plan    Skin to skin initiation date/time: 1841    Skin to skin with: Mother  Skin to skin end date/time:        Labor Events and Shoulder Dystocia    Fetal Tracing Prior to Delivery: Category 1  Shoulder dystocia present?: Neg     Delivery (Maternal) (Provider to Complete) ([id number])    Episiotomy: None  Perineal lacerations: 2nd    Est. blood loss (mL): 300  Repair suture: 3-0 Vicryl  Number of repair packets: 1  Genital tract inspection done: Pos     Blood Loss  Mother: Renetta Mccauley #[id number]   Start of Mother's Information    Delivery Blood Loss  22 1700 - 22 1732    EBL (mL) Hospital Encounter 300 mL    Delivery QBL (mL) Hospital Encounter 476 mL    Total  776 mL         End of Mother's Information  Mother: Renetta Mccauley #[id number]          Delivery - Provider to Complete ([id number])    Delivering clinician: Rasta Huber MD  Attempted Delivery Types (Choose all that apply): Spontaneous Vaginal Delivery  Delivery Type (Choose the 1 that will go to the Birth History): , Spontaneous                   Other personnel:  Provider Role   Janiya Abdalla RN Delivery Nurse   Janiya Atkins RN Delivery Assist   Rasta Huber MD                 Placenta    Date/Time: 2022  2:53 PM  Removal: Spontaneous  Disposition: Hospital disposal           Anesthesia    Method: Epidural  Cervical dilation at placement: 4-7                 Presentation and Position    Presentation: Vertex    Position: Right Occiput Anterior                 Rasta Huber MD

## 2022-05-20 NOTE — ANESTHESIA PROCEDURE NOTES
Epidural catheter Procedure Note    Pre-Procedure   Staff -        Anesthesiologist:  Wally Mccall MD       Performed By: anesthesiologist       Location: OB       Procedure Start/Stop Times: 5/20/2022 12:52 AM and 5/20/2022 1:10 AM       Pre-Anesthestic Checklist: patient identified, IV checked, risks and benefits discussed, informed consent, monitors and equipment checked, pre-op evaluation, at physician/surgeon's request and post-op pain management  Timeout:       Correct Patient: Yes        Correct Procedure: Yes        Correct Site: Yes        Correct Position: Yes   Procedure Documentation  Procedure: epidural catheter       Patient Position: sitting       Skin prep: Chloraprep       Local skin infiltrated with 3 mL of 1% lidocaine.        Insertion Site: L3-4. (midline approach).       Technique: LORT saline        Needle Type: [employer]       Needle Gauge: 18.        Needle Length (Inches): 3.5        Catheter: 20 G.          Catheter threaded easily.             # of attempts: 1 and  # of redirects:  0    Assessment/Narrative         Paresthesias: No.       Test dose of 3 mL lidocaine 1.5% w/ 1:200,000 epinephrine at 01:01 CDT.        .       Insertion/Infusion Method: LORT saline       Aspiration negative for Heme or CSF via Epidural Catheter.    Medication(s) Administered   0.25% Bupivacaine PF (Epidural) - EPIDURAL   8 mL - 5/20/2022 1:05:00 AM  Medication Administration Time: 5/20/2022 12:52 AM

## 2022-05-20 NOTE — ANESTHESIA PREPROCEDURE EVALUATION
Anesthesia Pre-Procedure Evaluation    Patient: Renetta Mccauley   MRN: [id number] : 1990        Procedure :           Past Medical History:   Diagnosis Date     Hypertension      Migraine       Past Surgical History:   Procedure Laterality Date     APPENDECTOMY OPEN  2007     CYST REMOVAL Right 2015    wrist     CYST REMOVAL Left 2003    foot     LEEP TX, CERVICAL  2015     LRTI Right 2009    thumb     WISDOM TOOTH EXTRACTION  2011      Allergies   Allergen Reactions     Ceclor [Cefaclor] Swelling     Pertussis Vaccine Swelling      Social History     Tobacco Use     Smoking status: Former Smoker     Years: 5.00     Types: Cigarettes     Quit date: 2016     Years since quittin.0     Smokeless tobacco: Never Used   Substance Use Topics     Alcohol use: Not Currently     Comment: prior to pregnancy occasional      Wt Readings from Last 1 Encounters:   22 108.4 kg (239 lb)        Anesthesia Evaluation   Pt has had prior anesthetic.     No history of anesthetic complications       ROS/MED HX  ENT/Pulmonary:  - neg pulmonary ROS     Neurologic:  - neg neurologic ROS     Cardiovascular:  - neg cardiovascular ROS     METS/Exercise Tolerance:     Hematologic:  - neg hematologic  ROS     Musculoskeletal:       GI/Hepatic:  - neg GI/hepatic ROS     Renal/Genitourinary:       Endo:     (+) Obesity,     Psychiatric/Substance Use:  - neg psychiatric ROS     Infectious Disease:       Malignancy:       Other:            Physical Exam    Airway        Mallampati: I    Neck ROM: full     Respiratory Devices and Support         Dental           Cardiovascular             Pulmonary                   OUTSIDE LABS:  CBC:   Lab Results   Component Value Date    WBC 11.6 (H) 2022    HGB 12.6 2022    HCT 37.2 2022     2022     2022     BMP:   Lab Results   Component Value Date     2022    POTASSIUM 3.8 2022    CHLORIDE 107 2022    CO2 20 (L)  05/18/2022    BUN 6 (L) 05/18/2022    CR 0.60 05/18/2022     05/18/2022     COAGS: No results found for: PTT, INR, FIBR  POC: No results found for: BGM, HCG, HCGS  HEPATIC:   Lab Results   Component Value Date    ALT 17 05/18/2022    AST 17 05/18/2022     OTHER:   Lab Results   Component Value Date    RUSSELL 9.3 05/18/2022       Anesthesia Plan    ASA Status:  3      Anesthesia Type: Epidural.              Consents    Anesthesia Plan(s) and associated risks, benefits, and realistic alternatives discussed. Questions answered and patient/representative(s) expressed understanding.    - Discussed:     - Discussed with:  Patient, Spouse         Postoperative Care            Comments:           neg OB ROS.       Wally Mccall MD

## 2022-05-20 NOTE — PLAN OF CARE
Problem: Delayed Labor Progression (Labor)  Goal: Effective Progression to Delivery  2022 1518 by Janiya Abdalla, RN  Outcome: Ongoing, Progressing     Problem: Change in Fetal Wellbeing (Labor)  Goal: Stable Fetal Wellbeing  2022 1518 by Janiya Abdalla, RN  Outcome: Ongoing, Progressing      @ 1446 after 4 hours of pushing. QBL = 350. 2nd degree lac repaired.

## 2022-05-20 NOTE — PLAN OF CARE
"  Problem: Plan of Care - These are the overarching goals to be used throughout the patient stay.    Goal: Plan of Care Review/Shift Note  Description: The Plan of Care Review/Shift note should be completed every shift.  The Outcome Evaluation is a brief statement about your assessment that the patient is improving, declining, or no change.  This information will be displayed automatically on your shift note.  Outcome: Ongoing, Progressing  Flowsheets (Taken 2022 1901)  Plan of Care Reviewed With:   patient   spouse     Problem: Plan of Care - These are the overarching goals to be used throughout the patient stay.    Goal: Patient-Specific Goal (Individualized)  Description: You can add care plan individualizations to a care plan. Examples of Individualization might be:  \"Parent requests to be called daily at 9am for status\", \"I have a hard time hearing out of my right ear\", or \"Do not touch me to wake me up as it startles me\".  Outcome: Ongoing, Progressing     Problem: Labor Pain (Labor)  Goal: Acceptable Pain Control  Outcome: Ongoing, Progressing     Problem: Plan of Care - These are the overarching goals to be used throughout the patient stay.    Goal: Readiness for Transition of Care  Outcome: Ongoing, Progressing     Problem: Plan of Care - These are the overarching goals to be used throughout the patient stay.    Goal: Optimal Comfort and Wellbeing  Intervention: Monitor Pain and Promote Comfort  Recent Flowsheet Documentation  Taken 2022 1700 by Ana Wiley, ARELY  Pain Management Interventions:   care clustered   pillow support provided   quiet environment facilitated   , 38wk1d, IOL for gestational hypertension, c/o headache that resolved with tylenol earlier this shift, denies shortness of breath, chest pain, RUQ pain, change in vision.  2+ edema in lower extremities, SVE by Dr. Huber 3/70/-1, SROM at 1704, clear fluid. Moderate variability with accels, period of minimal variability " "resolved with position changes. 3-4 moderate to palpate contractions in 10 minutes, patient states, \"I feel the contractions are getting strong.\" Patient voiding without difficulty, continue position changes. VS stable.  "

## 2022-05-21 PROCEDURE — 250N000013 HC RX MED GY IP 250 OP 250 PS 637: Performed by: OBSTETRICS & GYNECOLOGY

## 2022-05-21 PROCEDURE — 120N000001 HC R&B MED SURG/OB

## 2022-05-21 RX ORDER — OXYTOCIN/0.9 % SODIUM CHLORIDE 30/500 ML
340 PLASTIC BAG, INJECTION (ML) INTRAVENOUS CONTINUOUS PRN
Status: DISCONTINUED | OUTPATIENT
Start: 2022-05-21 | End: 2022-05-22 | Stop reason: HOSPADM

## 2022-05-21 RX ORDER — IBUPROFEN 800 MG/1
800 TABLET, FILM COATED ORAL EVERY 6 HOURS PRN
Status: DISCONTINUED | OUTPATIENT
Start: 2022-05-21 | End: 2022-05-22 | Stop reason: HOSPADM

## 2022-05-21 RX ORDER — FUROSEMIDE 20 MG
20 TABLET ORAL DAILY
Status: DISCONTINUED | OUTPATIENT
Start: 2022-05-21 | End: 2022-05-21

## 2022-05-21 RX ORDER — DOCUSATE SODIUM 100 MG/1
100 CAPSULE, LIQUID FILLED ORAL DAILY
Status: DISCONTINUED | OUTPATIENT
Start: 2022-05-21 | End: 2022-05-22 | Stop reason: HOSPADM

## 2022-05-21 RX ORDER — HYDROCORTISONE 25 MG/G
CREAM TOPICAL 3 TIMES DAILY PRN
Status: DISCONTINUED | OUTPATIENT
Start: 2022-05-21 | End: 2022-05-22 | Stop reason: HOSPADM

## 2022-05-21 RX ORDER — FUROSEMIDE 40 MG
40 TABLET ORAL
Status: DISPENSED | OUTPATIENT
Start: 2022-05-21 | End: 2022-05-22

## 2022-05-21 RX ORDER — MISOPROSTOL 200 UG/1
400 TABLET ORAL
Status: DISCONTINUED | OUTPATIENT
Start: 2022-05-21 | End: 2022-05-22 | Stop reason: HOSPADM

## 2022-05-21 RX ORDER — MISOPROSTOL 200 UG/1
800 TABLET ORAL
Status: DISCONTINUED | OUTPATIENT
Start: 2022-05-21 | End: 2022-05-22 | Stop reason: HOSPADM

## 2022-05-21 RX ORDER — BISACODYL 10 MG
10 SUPPOSITORY, RECTAL RECTAL DAILY PRN
Status: DISCONTINUED | OUTPATIENT
Start: 2022-05-21 | End: 2022-05-22 | Stop reason: HOSPADM

## 2022-05-21 RX ORDER — CARBOPROST TROMETHAMINE 250 UG/ML
250 INJECTION, SOLUTION INTRAMUSCULAR
Status: DISCONTINUED | OUTPATIENT
Start: 2022-05-21 | End: 2022-05-22 | Stop reason: HOSPADM

## 2022-05-21 RX ORDER — MODIFIED LANOLIN
OINTMENT (GRAM) TOPICAL
Status: DISCONTINUED | OUTPATIENT
Start: 2022-05-21 | End: 2022-05-22 | Stop reason: HOSPADM

## 2022-05-21 RX ORDER — OXYTOCIN 10 [USP'U]/ML
10 INJECTION, SOLUTION INTRAMUSCULAR; INTRAVENOUS
Status: DISCONTINUED | OUTPATIENT
Start: 2022-05-21 | End: 2022-05-22 | Stop reason: HOSPADM

## 2022-05-21 RX ORDER — METHYLERGONOVINE MALEATE 0.2 MG/ML
200 INJECTION INTRAVENOUS
Status: DISCONTINUED | OUTPATIENT
Start: 2022-05-21 | End: 2022-05-22 | Stop reason: HOSPADM

## 2022-05-21 RX ORDER — ACETAMINOPHEN 325 MG/1
650 TABLET ORAL EVERY 4 HOURS PRN
Status: DISCONTINUED | OUTPATIENT
Start: 2022-05-21 | End: 2022-05-22 | Stop reason: HOSPADM

## 2022-05-21 RX ADMIN — ACETAMINOPHEN 650 MG: 325 TABLET, COATED ORAL at 14:45

## 2022-05-21 RX ADMIN — WITCH HAZEL: 500 SOLUTION RECTAL; TOPICAL at 14:00

## 2022-05-21 RX ADMIN — ACETAMINOPHEN 650 MG: 325 TABLET, COATED ORAL at 20:31

## 2022-05-21 RX ADMIN — HYDROCORTISONE: 25 CREAM TOPICAL at 14:00

## 2022-05-21 RX ADMIN — IBUPROFEN 800 MG: 800 TABLET ORAL at 08:38

## 2022-05-21 RX ADMIN — DOCUSATE SODIUM 100 MG: 100 CAPSULE, LIQUID FILLED ORAL at 08:38

## 2022-05-21 RX ADMIN — BENZOCAINE AND LEVOMENTHOL: 200; 5 SPRAY TOPICAL at 14:00

## 2022-05-21 RX ADMIN — FUROSEMIDE 40 MG: 40 TABLET ORAL at 09:03

## 2022-05-21 RX ADMIN — IBUPROFEN 800 MG: 800 TABLET ORAL at 17:12

## 2022-05-21 NOTE — LACTATION NOTE
"This note was copied from a baby's chart.  Rounded on family for lactation support per nurse request.  This is the first baby for parents.  Mom's goal is to breastfeed. She intends to use an yobani stride breastpump that she has yet to obtain.  Assessed baby's suck with a gloved finger.  She is able to extend her tongue but LC unable to elicit a suck. Baby tended to bite instead. Mom's pushing was > 3 hours. Baby noted to have tight back and shoulders. Encouraged parents to massage baby while holding.  Oral stim demonstrated.  Assisted mom to achieve a deep asymmetrical latch.  Baby had successful latch with audible swallows. Clicking noted occasionally however the sound stopped when baby was brought closer to the breast and her chin was buried deeper in to breast tissue. .      Educated/reviewed hand expression using \"press, compress and release\".  Mom had good success.  Directed mom to hand expression video and breastfeeding support on [email or web address] for home reference.  Educated/reviewed milk production of supply and demand.  The baby is born; the placenta is delivered; the hormones surge; and the body is stimulated to make milk.  Encouraged mom to breastfeed on demand with a goal of 8-12 feedings per day to help milk production. Reviewed expectation of full milk arriving by 3-5 days of life.   Educated/reviewed signs of milk transfer with gentle tug at the breast, audible swallows and wet and soiled diapers per the education folder I & O.   Reviewed use of education folder for self learning, lactation and community support, indicators to call MD and maternal/family well being.    Marylin Biswas, RNC,IBCLC  "

## 2022-05-21 NOTE — ANESTHESIA POSTPROCEDURE EVALUATION
Patient: Renetta Mccauley    Procedure: * No procedures listed *       Anesthesia Type:  Epidural    Note:  Disposition: Inpatient   Postop Pain Control: Uneventful            Sign Out: Well controlled pain   PONV: No   Neuro/Psych: Uneventful            Sign Out: Acceptable/Baseline neuro status   Airway/Respiratory: Uneventful            Sign Out: Acceptable/Baseline resp. status   CV/Hemodynamics: Uneventful            Sign Out: Acceptable CV status; No obvious hypovolemia; No obvious fluid overload   Other NRE: NONE   DID A NON-ROUTINE EVENT OCCUR? No           Last vitals:  Vitals:    05/20/22 2030 05/21/22 0100 05/21/22 0750   BP: 128/75 119/74 132/76   Pulse: 68 98 100   Resp: 16 17 16   Temp: 37  C (98.6  F) 36.6  C (97.9  F) 36.8  C (98.3  F)   SpO2:   98%     No apparent complications from labor epidural.      Electronically Signed By: Bentley Payan MD  May 21, 2022  2:48 PM

## 2022-05-21 NOTE — PLAN OF CARE
Problem: Plan of Care - These are the overarching goals to be used throughout the patient stay.    Goal: Optimal Comfort and Wellbeing  Outcome: Ongoing, Progressing     Problem: Pain (Postpartum Vaginal Delivery)  Goal: Acceptable Pain Control  Outcome: Ongoing, Progressing     VSS. Pain well controlled with PRN pain meds. Pt encouraged to take sitz bath today.

## 2022-05-21 NOTE — PROGRESS NOTES
"Postpartum Day 1: Vaginal Delivery    Subjective:  The patient feels well. The patient has no emotional concerns. Pain is controlled with current medications. The patient is ambulating well. She is voiding and passing gas.The amount and color of the lochia is appropriate for the duration of recovery, patient denies clots. The baby is doing well.    Objective   The patient has a blood pressure which is within the normal range. The uterine fundus is firm. Urinary output is adequate.     Exam:   /74 (BP Location: Left arm, Patient Position: Semi-Nguyen's)   Pulse 98   Temp 97.9  F (36.6  C) (Oral)   Resp 17   Ht 1.626 m (5' 4\")   Wt 108.4 kg (239 lb)   SpO2 97%   Breastfeeding Unknown   BMI 41.02 kg/m    General: NAD  Abdomen: soft, NT   Fundus: firm, NT  Ext: no pain, 2+ pitting bilateral     Impression:   Normal postpartum course  Edema    Plan:   - Continue current care.  - Likely home tomorrow  - Po lasix to assist with edema      Natalee Cochran DO, FACOG  5/21/2022 7:52 AM     "

## 2022-05-22 VITALS
OXYGEN SATURATION: 98 % | SYSTOLIC BLOOD PRESSURE: 107 MMHG | BODY MASS INDEX: 40.8 KG/M2 | HEIGHT: 64 IN | TEMPERATURE: 98 F | WEIGHT: 239 LBS | DIASTOLIC BLOOD PRESSURE: 60 MMHG | RESPIRATION RATE: 20 BRPM | HEART RATE: 77 BPM

## 2022-05-22 LAB — HGB BLD-MCNC: 9.9 G/DL (ref 11.7–15.7)

## 2022-05-22 PROCEDURE — 36415 COLL VENOUS BLD VENIPUNCTURE: CPT | Performed by: STUDENT IN AN ORGANIZED HEALTH CARE EDUCATION/TRAINING PROGRAM

## 2022-05-22 PROCEDURE — 85018 HEMOGLOBIN: CPT | Performed by: STUDENT IN AN ORGANIZED HEALTH CARE EDUCATION/TRAINING PROGRAM

## 2022-05-22 PROCEDURE — 250N000013 HC RX MED GY IP 250 OP 250 PS 637: Performed by: OBSTETRICS & GYNECOLOGY

## 2022-05-22 RX ORDER — IBUPROFEN 800 MG/1
800 TABLET, FILM COATED ORAL EVERY 8 HOURS PRN
COMMUNITY
Start: 2022-05-22

## 2022-05-22 RX ADMIN — IBUPROFEN 800 MG: 800 TABLET ORAL at 09:04

## 2022-05-22 RX ADMIN — DOCUSATE SODIUM 100 MG: 100 CAPSULE, LIQUID FILLED ORAL at 09:04

## 2022-05-22 RX ADMIN — ACETAMINOPHEN 650 MG: 325 TABLET, COATED ORAL at 09:04

## 2022-05-22 NOTE — DISCHARGE SUMMARY
[facility] Discharge Summary    Renetta Mccauley MRN# [id number]   Age: 32 year old YOB: 1990     Date of Admission:  2022  Date of Discharge::  2022  Admitting Physician:  Rasta Huber MD  Discharge Physician:  Luz Ford MD               Admission Diagnoses:   PIH (pregnancy induced hypertension) [O13.9]          Discharge Diagnosis:   * No surgery found *          Procedures:   Procedure(s):        No other significant procedures performed during this admission           Medications Prior to Admission:     Medications Prior to Admission   Medication Sig Dispense Refill Last Dose     acetaminophen (TYLENOL) 325 MG tablet Take 325-650 mg by mouth every 6 hours as needed for mild pain   2022 at 1600     docusate sodium (COLACE) 100 MG capsule Take 100 mg by mouth 2 times daily   2022 at Unknown time     famotidine (PEPCID) 10 MG tablet Take 10 mg by mouth daily   2022 at Unknown time     magnesium 250 MG tablet Take 1 tablet by mouth daily   2022 at Unknown time     Prenatal Vit-Fe Fumarate-FA (PRENATAL MULTIVITAMIN W/IRON) 27-0.8 MG tablet Take 1 tablet by mouth daily   2022 at Unknown time     Vitamin D (Cholecalciferol) 25 MCG (1000 UT) TABS Take 2 tablets by mouth daily   2022 at Unknown time     [DISCONTINUED] doxylamine (UNISOM) 25 MG TABS tablet Take 25 mg by mouth At Bedtime   2022 at Unknown time             Discharge Medications:     Current Discharge Medication List      START taking these medications    Details   ibuprofen (ADVIL/MOTRIN) 800 MG tablet Take 1 tablet (800 mg) by mouth every 8 hours as needed for other (cramping)    Associated Diagnoses: Single liveborn infant delivered vaginally         CONTINUE these medications which have NOT CHANGED    Details   acetaminophen (TYLENOL) 325 MG tablet Take 325-650 mg by mouth every 6 hours as needed for mild pain      docusate sodium (COLACE) 100 MG capsule  "Take 100 mg by mouth 2 times daily      famotidine (PEPCID) 10 MG tablet Take 10 mg by mouth daily      magnesium 250 MG tablet Take 1 tablet by mouth daily      Prenatal Vit-Fe Fumarate-FA (PRENATAL MULTIVITAMIN W/IRON) 27-0.8 MG tablet Take 1 tablet by mouth daily      Vitamin D (Cholecalciferol) 25 MCG (1000 UT) TABS Take 2 tablets by mouth daily         STOP taking these medications       doxylamine (UNISOM) 25 MG TABS tablet Comments:   Reason for Stopping:                     Consultations:   No consultations were requested during this admission          Brief History of Labor or Admission:   Renetta Mccauley is a 32 year old female who is 38w0d pregnant and being admitted for Gestational Hypertension. Has hisotry of migraines and the current HA is mild. No visual changes, abd pain.BPs with diastolics in 90s at [facility] today. No contracitons, vag bleeding or ROM. Good FM.             Hospital Course:   Patient was admitted for IOL due to gHTN.  She progressed through labor and delivered vaginally on 05/20/2022.  QBL was 776 mL.  She had an  Uncomplicated postpartum course.  On PPD#2 she was doing well, meeting discharge goals.  BP's in normal range.  She reported feeling light-headed with ambulation, repeat hemoglobin checked.  Pain well-controlled.      PE:  /66 (BP Location: Left arm)   Pulse 89   Temp 98.3  F (36.8  C) (Oral)   Resp 17   Ht 1.626 m (5' 4\")   Wt 108.4 kg (239 lb)   SpO2 98%   Breastfeeding Unknown   BMI 41.02 kg/m    General: NAD  CV: RRR  Lungs: clear    Post-partum hemoglobin:   Hemoglobin   Date Value Ref Range Status   05/18/2022 12.6 11.7 - 15.7 g/dL Final             Discharge Instructions and Follow-Up:   Discharge diet: Regular   Discharge activity: Pelvic rest: abstain from intercourse and do not use tampons for 6 week(s)   Discharge follow-up: Follow up with Dr. Huber in 6 weeks               Discharge Disposition:   Discharged to home          Luz Ford MD "     Addendum:  Appropriate drop in hemoglobin consistent with acute blood loss anemia, discharged home with prenatal vitamin which contains iron.  MD Henrique

## 2022-05-22 NOTE — PROGRESS NOTES
Pt's PP mood scores high at 13. Pt and spouse endorse attending weekly therapy and having a plan for help and support. Decline SW visit at this time.

## 2022-05-22 NOTE — DISCHARGE INSTRUCTIONS
Understanding Preeclampsia  Preeclampsia is high blood pressure (hypertension) that happens during pregnancy. It often shows up around the 20th week of pregnancy. It often goes back to normal by the 12th week after you give birth. It can lead to serious health risks for you and your baby. During your pregnancy, your healthcare provider will watch your blood pressure.      Your blood pressure will be monitored regularly throughout your pregnancy to help check for preeclampsia.   Symptoms  A common symptom of preeclampsia is high blood pressure. Other symptoms may include:   Rapid weight gain  Protein in your urine  Headache  Belly (abdominal) pain on your right side  Vision problems. These include flashes or spots.  Swelling (edema) in your face or hands. This also often happens near the end of normal pregnancies, even without preeclampsia.  Tests you may have  Your healthcare provider will want to check your blood pressure throughout your pregnancy. If your blood pressure is high, you may have the following tests:   Urine tests to look for protein  Blood tests to confirm preeclampsia  Fetal monitoring to make sure that your baby is healthy  Treating preeclampsia  You may need to take a daily low dose of aspirin if you are at risk for preeclampsia. Preeclampsia almost always ends soon after you give birth. Until then, your healthcare provider can help manage your condition. If your symptoms are mild, you may need activity limits at home, including bed rest and no heavy lifting. If your symptoms are severe, you will stay in the hospital. Hospital treatment includes:   Activity limits to help control blood pressure. This means no heavy lifting and 8 hours per day lying down with the feet up.  Magnesium IV (intravenous) drip during labor to prevent seizures  Induced labor or surgical delivery by  section. Delivery is considered the cure for preeclampsia.  When to call your healthcare provider  Call your  healthcare provider if swelling, weight gain, or other symptoms come on quickly or are severe. Some cases of preeclampsia are more severe than others. Your symptoms also may change or get worse as you get closer to your due date.   Who s at risk?  No one knows what causes preeclampsia. Preeclampsia can happen in any pregnant woman. But it is more common in first-time pregnancies. Things that increase the risk include:   Previous pregnancies. You are at risk if you had preeclampsia, intrauterine growth retardation (IUGR),  birth, placental abruption, or fetal death in a past pregnancy.  Health history of mother. You are at risk if you have diabetes, high blood pressure, obesity, kidney disease, autoimmune disease such as lupus, or a family history of preeclampsia.  Current pregnancy. You are at risk if this is your first pregnancy, or if you have multiple fetuses, are younger than age 18 or older than 40, or used in vitro  fertilization.  Race. You are at risk if you are black.  Dangers of preeclampsia  If not treated, preeclampsia can cause problems for you and your baby. The placenta is the organ that nourishes your baby. It may tear away from the uterine wall. This can put the baby at risk for health problems (fetal distress) and premature birth. Preeclampsia can also cause these health problems:   Kidney failure or other organ damage  Seizures  Stroke  Once you give birth  In most cases, preeclampsia goes away on its own soon after you give birth. This is often by the 12th week after you deliver. Within days of delivery, your blood pressure, swelling, and other symptoms should get better. For some women, problems from preeclampsia can continue after delivery.   Postpartum preeclampsia that develops within the first 48 hours after delivery is rare. Another type of postpartum preeclampsia that develops more than 48 hours after delivery is called late-onset preeclampsia. It is also rare. Contact your  healthcare provider right away if you have symptoms of preeclampsia after you deliver.   [employer] last reviewed this educational content on 12/1/2019 2000-2021 [employer]. All rights reserved. This information is not intended as a substitute for professional medical care. Always follow your healthcare professional's instructions.      Postpartum Vaginal Delivery Instructions    Activity     Ask family and friends for help when you need it.  Do not place anything in your vagina for 6 weeks.  You are not restricted on other activities, but take it easy for a few weeks to allow your body to recover from delivery.  You are able to do any activities you feel up to that point.  No driving until you have stopped taking your pain medications (usually two weeks after delivery).     Call your health care provider if you have any of these symptoms:     Increased pain, swelling, redness, or fluid around your stiches from an episiotomy or perineal tear.  A fever above 100.4 F (38 C) with or without chills when placing a thermometer under your tongue.  You soak a sanitary pad with blood within 1 hour, or you see blood clots larger than a golf ball.  Bleeding that lasts more than 6 weeks.  Vaginal discharge that smells bad.  Severe pain, cramping or tenderness in your lower belly area.  A need to urinate more frequently (use the toilet more often), more urgently (use the toilet very quickly), or it burns when you urinate.  Nausea and vomiting.  Redness, swelling or pain around a vein in your leg.  Problems breastfeeding or a red or painful area on your breast.  Chest pain and cough or are gasping for air.  Problems coping with sadness, anxiety, or depression.  If you have any concerns about hurting yourself or the baby, call your provider immediately.   You have questions or concerns after you return home.     Keep your hands clean:  Always wash your hands before touching your perineal area and stitches.  This  helps reduce your risk of infection.  If your hands aren't dirty, you may use an alcohol hand-rub to clean your hands. Keep your nails clean and short.      Understanding Postpartum Depression  It s common for new moms to feel tired and spangler after having a baby. But if you have very strong feelings of sadness or anger, or have trouble doing your normal daily tasks, you may have a serious mood disorder called postpartum depression. Left untreated, it may stop you from bonding with your baby. It s important to see your healthcare provider right away and get help.   What is postpartum depression?  Postpartum depression is a serious mood disorder that affects some women after giving birth. About 3 in 20 new moms have this condition. Symptoms can include feeling very intense sadness, tiredness, and anxiety. These feelings can become so strong that it s hard to do your normal daily tasks. You may find it hard to take care of yourself and your baby.   Symptoms may start about 1 to 3 weeks after your baby is born. But they can also appear up to a year later. If you or the people around you think you may have postpartum depression, or if you have symptoms for more than 2 weeks, call your healthcare provider right away. Treatment is available.   Is it postpartum depression or the baby blues?  Postpartum depression is different than the  baby blues  (postpartum blues). The baby blues is a common condition that many new moms have soon after giving birth. They may feel sad, spangler, or anxious. But these feelings go away in about 2 weeks. If you still have these feelings after 2 weeks, or if you start to feel worse at any time, call your healthcare provider. He or she will evaluate you to see if you have postpartum depression. It s important to get treated right away.   What causes postpartum depression?  Postpartum depression is likely caused by a mix of physical and emotional factors. These include:   Changing hormone levels.  Right after you have a baby, your levels of estrogen and progesterone drop. This sudden change may set off depression. Your thyroid hormone levels may also drop at the same time.  Depression. You may be at greater risk if you have a history of depression, or if you are currently being treated for depression.  Extreme tiredness (fatigue). It can take a few weeks to physically recover from giving birth. In addition, most new moms don t get the rest or sleep they need.  Lifestyle issues. Other stressors may also play a role. These can include money, health, or relationship problems, or not getting support from friends or family.  Symptoms of postpartum depression  Symptoms may vary. But they are very intense and may include:  Feeling very tired, with no energy (fatigue)  Crying often or for no reason  Feeling very sad, anxious, or overwhelmed  Being spangler  Feeling that you can t take care of your baby  Sleeping too much, or not sleeping  Having trouble eating  Having trouble making decisions and focusing  Not being able to do your normal daily tasks  Not being interested in your baby  Not wanting to be around family or friends  Wanting to hurt yourself or your baby  Diagnosing postpartum depression  Early diagnosis and treatment are important. Only a healthcare provider can diagnose you. Call your provider if you have symptoms of depression, or you have trouble doing your normal activities, for longer than 2 weeks.   Your provider will talk with you and ask for a health history. You may be asked to fill out a form that helps to identify depression. You may also have some blood tests done. These are used to find out if your symptoms may be caused by a thyroid disorder or other health condition.   If not treated, postpartum depression can have serious effects for you and your baby. Women with this condition who are not treated:   Are less likely to breastfeed their baby  May not bond with their baby  Are less likely to  take good care of their baby  Are more likely to have problems with their partner  May have thoughts of harming themselves  May have thoughts of harming their baby  Are you at risk for postpartum depression?  You are more at risk for this condition if you have:  A history of depression (including postpartum depression in a past pregnancy)  A family member with depression  A history of alcohol or drug abuse  Had a difficult childbirth, such as a premature birth  A baby with health problems or special needs  No emotional support from your partner, family, or friends  Other stresses in your life, such as money or relationship problems  Mixed feelings about this pregnancy, such as with an unplanned pregnancy  Treatment for postpartum depression  Treatment is available. Your healthcare provider can help you decide on the best treatment option for you. He or she may advise:   Medicine. Antidepressants are the main type of medicine for postpartum depression. These medicines affect the brain chemicals that help control moods. Let your provider know if you are breastfeeding. Most antidepressants are considered safe to use when breastfeeding.  Talk therapy (counseling or psychotherapy). This treatment involves talking with a mental health provider about your feelings. This may be done in private sessions with just you and the provider, or it may be done in a group therapy session.  These treatments can be used alone or together.  Joining a support group for moms with this condition may also help you cope. Check online for support groups in your area, or ask your healthcare provider for suggestions.   Managing postpartum depression  In addition to seeing your healthcare provider and getting treatment, it s important to take care of yourself. Tell your family and friends how you are feeling and what kind of help or support you need. Make sure to also:   Get enough sleep  Eat healthy foods  Rest when your baby takes a nap  Get  some light exercise  Don t try to do chores  Ask for and accept any help with meals, shopping, and laundry  To learn more  Find more information at:  [employer] www.postpartum.net/  Office on Women s Health www.womenshealth.gov/  Brian last reviewed this educational content on 6/1/2020 2000-2021 [employer]. All rights reserved. This information is not intended as a substitute for professional medical care. Always follow your healthcare professional's instructions.

## 2022-05-24 LAB
% BASOPHILS (EXTERNAL): 0.4 %
% EOSINOPHILS (EXTERNAL): 0.9 %
% IMMATURE GRANULOCYTES (EXTERNAL): 0.8 %
% LYMPHOCYTES (EXTERNAL): 22.7 %
% MONOCYTES (EXTERNAL): 6 %
% NEUTROPHILS (EXTERNAL): 69.2 %
ABSOLUTE BASOPHILS (EXTERNAL): 0.05 K/UL (ref 0–0.2)
ABSOLUTE EOSINOPHILS (EXTERNAL): 0.11 K/UL (ref 0–0.45)
ABSOLUTE IMMATURE GRANULOCYTES (EXTERNAL): 0.1 K/UL
ABSOLUTE LYMPHOCYTES (EXTERNAL): 2.92 K/UL (ref 1–4)
ABSOLUTE MONOCYTES (EXTERNAL): 0.77 K/UL (ref 0–1)
ABSOLUTE NEUTROPHILS (EXTERNAL): 8.92 K/UL (ref 1.8–7.8)
ERYTHROCYTE [DISTWIDTH] IN BLOOD BY AUTOMATED COUNT: 12.6 % (ref 11.5–14.5)
HEMATOCRIT (EXTERNAL): 41.9 % (ref 36–48)
HEMOGLOBIN (EXTERNAL): 14 GM/DL (ref 12–16)
HEPATITIS C ANTIBODY (EXTERNAL): NORMAL
MCH RBC QN AUTO: 31 PG (ref 27–33)
MCHC RBC AUTO-ENTMCNC: 33 GM/DL (ref 33–36)
MCV RBC AUTO: 91 FL (ref 80–100)
NUCLEATED RBCS (EXTERNAL): 0 /100 WBC (ref 0–0)
PLATELET COUNT (EXTERNAL): 270 K/UL (ref 150–400)
RBC # BLD AUTO: 4.59 M/UL (ref 4.2–5.4)
RUBELLA ANTIBODY IGG (EXTERNAL): NORMAL
TREPONEMA PALLIDUM ANTIBODY (EXTERNAL): NON REACTIVE
WBC COUNT (EXTERNAL): 12.9 K/UL (ref 4.3–10.8)